# Patient Record
Sex: FEMALE | Race: WHITE | NOT HISPANIC OR LATINO | Employment: PART TIME | URBAN - METROPOLITAN AREA
[De-identification: names, ages, dates, MRNs, and addresses within clinical notes are randomized per-mention and may not be internally consistent; named-entity substitution may affect disease eponyms.]

---

## 2017-10-28 ENCOUNTER — OFFICE VISIT (OUTPATIENT)
Dept: RETAIL CLINIC | Facility: CLINIC | Age: 27
End: 2017-10-28

## 2017-10-28 VITALS
OXYGEN SATURATION: 100 % | SYSTOLIC BLOOD PRESSURE: 112 MMHG | DIASTOLIC BLOOD PRESSURE: 78 MMHG | RESPIRATION RATE: 18 BRPM | TEMPERATURE: 98.3 F | HEART RATE: 105 BPM

## 2017-10-28 DIAGNOSIS — B86 SCABIES: Primary | ICD-10-CM

## 2017-10-28 PROCEDURE — 99203 OFFICE O/P NEW LOW 30 MIN: CPT | Performed by: NURSE PRACTITIONER

## 2017-10-28 RX ORDER — PERMETHRIN 50 MG/G
CREAM TOPICAL ONCE
Qty: 120 G | Refills: 0 | Status: SHIPPED | OUTPATIENT
Start: 2017-10-28 | End: 2017-10-28

## 2017-10-28 RX ORDER — CETIRIZINE HYDROCHLORIDE 10 MG/1
10 TABLET ORAL DAILY
COMMUNITY

## 2017-10-28 NOTE — PATIENT INSTRUCTIONS
Scabies, Adult  Scabies is a skin condition that happens when very small insects get under the skin (infestation). This causes a rash and severe itchiness. Scabies can spread from person to person (is contagious). If you get scabies, it is common for others in your household to get scabies too.  With proper treatment, symptoms usually go away in 2-4 weeks. Scabies usually does not cause lasting problems.  CAUSES  This condition is caused by mites (Sarcoptes scabiei, or human itch mites) that can only be seen with a microscope. The mites get into the top layer of skin and lay eggs. Scabies can spread from person to person through:  · Close contact with a person who has scabies.  · Contact with infested items, such as towels, bedding, or clothing.  RISK FACTORS  This condition is more likely to develop in:  · People who live in nursing homes and other extended-care facilities.  · People who have sexual contact with a partner who has scabies.  · Young children who attend  facilities.  · People who care for others who are at increased risk for scabies.  SYMPTOMS  Symptoms of this condition may include:  · Severe itchiness. This is often worse at night.  · A rash that includes tiny red bumps or blisters. The rash commonly occurs on the wrist, elbow, armpit, fingers, waist, groin, or buttocks. Bumps may form a line (burrow) in some areas.  · Skin irritation. This can include scaly patches or sores.  DIAGNOSIS  This condition is diagnosed with a physical exam. Your health care provider will look closely at your skin. In some cases, your health care provider may take a sample of your affected skin (skin scraping) and have it examined under a microscope.  TREATMENT  This condition may be treated with:  · Medicated cream or lotion that kills the mites. This is spread on the entire body and left on for several hours. Usually, one treatment with medicated cream or lotion is enough to kill all of the mites. In severe  cases, the treatment may be repeated.  · Medicated cream that relieves itching.  · Medicines that help to relieve itching.  · Medicines that kill the mites. This treatment is rarely used.  HOME CARE INSTRUCTIONS  Medicines  · Take or apply over-the-counter and prescription medicines as told by your health care provider.  · Apply medicated cream or lotion as told by your health care provider.  · Do not wash off the medicated cream or lotion until the necessary amount of time has passed.  Skin Care  · Avoid scratching your affected skin.  · Keep your fingernails closely trimmed to reduce injury from scratching.  · Take cool baths or apply cool washcloths to help reduce itching.  General Instructions  · Clean all items that you recently had contact with, including bedding, clothing, and furniture. Do this on the same day that your treatment starts.  ¨ Use hot water when you wash items.  ¨ Place unwashable items into closed, airtight plastic bags for at least 3 days. The mites cannot live for more than 3 days away from human skin.  ¨ Vacuum furniture and mattresses that you use.  · Make sure that other people who may have been infested are examined by a health care provider. These include members of your household and anyone who may have had contact with infested items.  · Keep all follow-up visits as told by your health care provider. This is important.  SEEK MEDICAL CARE IF:  · You have itching that does not go away after 4 weeks of treatment.  · You continue to develop new bumps or burrows.  · You have redness, swelling, or pain in your rash area after treatment.  · You have fluid, blood, or pus coming from your rash.     This information is not intended to replace advice given to you by your health care provider. Make sure you discuss any questions you have with your health care provider.     Document Released: 09/07/2016 Document Reviewed: 09/07/2016  [employer] Patient Education ©2017 [employer]

## 2017-10-28 NOTE — PROGRESS NOTES
Subjective   Ivy Keane is a 27 y.o. female.     Rash   This is a new problem. Episode onset: 6 days ago. The problem is unchanged. The affected locations include the abdomen, left arm, right hand and left wrist. The rash is characterized by itchiness and redness. It is unknown (concerned about scabies, works as RN in hospital) if there was an exposure to a precipitant. Pertinent negatives include no congestion, cough, diarrhea, eye pain, facial edema, fatigue, fever, nail changes, rhinorrhea, shortness of breath, sore throat or vomiting. Past treatments include antihistamine. The treatment provided mild relief. There is no history of allergies, asthma or eczema.       The following portions of the patient's history were reviewed and updated as appropriate: allergies, current medications, past family history, past medical history, past social history, past surgical history and problem list.    Review of Systems   Constitutional: Negative for appetite change, chills, diaphoresis, fatigue and fever.   HENT: Negative for congestion, ear pain, facial swelling, postnasal drip, rhinorrhea, sinus pressure, sneezing and sore throat.    Eyes: Negative for pain, redness and itching.   Respiratory: Negative for cough and shortness of breath.    Cardiovascular: Negative for chest pain and palpitations.   Gastrointestinal: Negative for diarrhea, nausea and vomiting.   Musculoskeletal: Negative for myalgias.   Skin: Positive for rash. Negative for nail changes and wound.   Allergic/Immunologic: Negative for environmental allergies, food allergies and immunocompromised state.   Neurological: Negative for dizziness and headaches.   Hematological: Does not bruise/bleed easily.       Objective   Physical Exam   Constitutional: She is oriented to person, place, and time. She appears well-developed and well-nourished. She is cooperative.  Non-toxic appearance. She does not appear ill. No distress.   Cardiovascular: Normal rate,  regular rhythm, S1 normal and S2 normal.    Pulmonary/Chest: Effort normal and breath sounds normal.   Neurological: She is alert and oriented to person, place, and time.   Skin: Skin is warm and dry. Rash noted. Rash is papular. She is not diaphoretic. No pallor.        Vitals reviewed.      Assessment/Plan   Ivy was seen today for rash.    Diagnoses and all orders for this visit:    Scabies  -     permethrin (ELIMITE) 5 % cream; Apply  topically 1 (One) Time for 1 dose. Apply from neck to toes and leave on for 8-12 hours, wash off and repeat in 1 week          -     Treat home and linens/clothes        -     Other members of household need to be treated as well        -     Follow up with PCP or urgent treatment for persistent or worsening symptoms